# Patient Record
Sex: FEMALE | Race: WHITE | Employment: UNEMPLOYED | URBAN - NONMETROPOLITAN AREA
[De-identification: names, ages, dates, MRNs, and addresses within clinical notes are randomized per-mention and may not be internally consistent; named-entity substitution may affect disease eponyms.]

---

## 2021-11-28 ENCOUNTER — HOSPITAL ENCOUNTER (EMERGENCY)
Facility: HOSPITAL | Age: 51
Discharge: HOME | End: 2021-11-28
Attending: NURSE PRACTITIONER | Admitting: NURSE PRACTITIONER
Payer: COMMERCIAL

## 2021-11-28 VITALS
SYSTOLIC BLOOD PRESSURE: 133 MMHG | DIASTOLIC BLOOD PRESSURE: 74 MMHG | OXYGEN SATURATION: 98 % | TEMPERATURE: 99 F | HEART RATE: 66 BPM | RESPIRATION RATE: 14 BRPM

## 2021-11-28 DIAGNOSIS — R09.89 SYMPTOMS OF UPPER RESPIRATORY INFECTION (URI): ICD-10-CM

## 2021-11-28 DIAGNOSIS — Z20.822 EXPOSURE TO 2019 NOVEL CORONAVIRUS: Primary | ICD-10-CM

## 2021-11-28 LAB — GROUP A STREP BY PCR: NOT DETECTED

## 2021-11-28 PROCEDURE — C9803 HOPD COVID-19 SPEC COLLECT: HCPCS

## 2021-11-28 PROCEDURE — 87637 SARSCOV2&INF A&B&RSV AMP PRB: CPT | Performed by: NURSE PRACTITIONER

## 2021-11-28 PROCEDURE — 87651 STREP A DNA AMP PROBE: CPT | Performed by: NURSE PRACTITIONER

## 2021-11-28 PROCEDURE — G0463 HOSPITAL OUTPT CLINIC VISIT: HCPCS

## 2021-11-28 PROCEDURE — 99203 OFFICE O/P NEW LOW 30 MIN: CPT | Performed by: NURSE PRACTITIONER

## 2021-11-28 RX ORDER — SUCRALFATE 1 G/1
1 TABLET ORAL 2 TIMES DAILY
COMMUNITY

## 2021-11-28 RX ORDER — METFORMIN HCL 500 MG
500 TABLET, EXTENDED RELEASE 24 HR ORAL
COMMUNITY

## 2021-11-28 RX ORDER — PANTOPRAZOLE SODIUM 40 MG/1
40 TABLET, DELAYED RELEASE ORAL 2 TIMES DAILY
COMMUNITY

## 2021-11-28 RX ORDER — ESCITALOPRAM OXALATE 20 MG/1
20 TABLET ORAL DAILY
COMMUNITY

## 2021-11-28 ASSESSMENT — ENCOUNTER SYMPTOMS
TROUBLE SWALLOWING: 0
FEVER: 0
SORE THROAT: 1
CHILLS: 0
COUGH: 0
SHORTNESS OF BREATH: 0
APPETITE CHANGE: 0

## 2021-11-29 LAB
FLUAV RNA SPEC QL NAA+PROBE: NEGATIVE
FLUBV RNA RESP QL NAA+PROBE: NEGATIVE
RSV RNA SPEC NAA+PROBE: NEGATIVE
SARS-COV-2 RNA RESP QL NAA+PROBE: NEGATIVE

## 2021-11-29 NOTE — DISCHARGE INSTRUCTIONS
Make sure you are drinking fluids to stay hydrated.  Take an oral decongestant to help with your ear pain and sinus congestion.    We will notify you of your results when available.    Return to emergency room if worsening or concerning symptoms.

## 2021-11-29 NOTE — ED PROVIDER NOTES
History     Chief Complaint   Patient presents with     Covid Concern     pt requesting COVID swab. Exposure at her parents house, her father is positive      HPI  Harika Phillips is a 51 year old female who presents ambulatory to urgent care requesting a COVID-19 test.  Patient lives in [place].  She is in the area taking care of her mom who has COVID-19 infection.  In the last 3 days she has developed nasal congestion, sore throat and bilateral ear pain.  No cough, shortness of breath, fever or chills.  Eating and drinking okay.  She has had both of her COVID-19 vaccines.  Patient brought her dad in to the emergency department as he developed a fever.  While patient is here she decided she managed to get tested for COVID-19.  Patient has no other concerns at this time.    Allergies:  Allergies   Allergen Reactions     Corn Starch      Pork Allergy      Soy Allergy      Sulfa Drugs      Celecoxib Rash     Duloxetine Hcl Rash     Pregabalin Rash       Problem List:    There are no problems to display for this patient.       Past Medical History:    History reviewed. No pertinent past medical history.    Past Surgical History:    History reviewed. No pertinent surgical history.    Family History:    History reviewed. No pertinent family history.    Social History:  Marital Status:   [2]  Social History     Tobacco Use     Smoking status: None     Smokeless tobacco: None   Substance Use Topics     Alcohol use: None     Drug use: None        Medications:    escitalopram (LEXAPRO) 20 MG tablet  metFORMIN (GLUCOPHAGE-XR) 500 MG 24 hr tablet  pantoprazole (PROTONIX) 40 MG EC tablet  sucralfate (CARAFATE) 1 GM tablet          Review of Systems   Constitutional: Negative for appetite change, chills and fever.   HENT: Positive for congestion, ear pain and sore throat. Negative for ear discharge and trouble swallowing.    Respiratory: Negative for cough and shortness of breath.    All other systems reviewed and are  negative.      Physical Exam   BP: 133/74  Pulse: 66  Temp: 99  F (37.2  C)  Resp: 14  SpO2: 98 %      Physical Exam  Vitals and nursing note reviewed.   Constitutional:       Appearance: Normal appearance. She is not ill-appearing or toxic-appearing.   HENT:      Head: Normocephalic and atraumatic.      Jaw: No trismus.      Right Ear: Tympanic membrane and ear canal normal.      Left Ear: Tympanic membrane and ear canal normal.      Nose: Congestion present.      Mouth/Throat:      Mouth: Mucous membranes are moist.      Pharynx: Oropharynx is clear. Uvula midline. Posterior oropharyngeal erythema present. No oropharyngeal exudate.      Tonsils: No tonsillar exudate or tonsillar abscesses. 0 on the right. 0 on the left.   Eyes:      Pupils: Pupils are equal, round, and reactive to light.   Cardiovascular:      Rate and Rhythm: Normal rate and regular rhythm.      Heart sounds: Normal heart sounds.   Pulmonary:      Effort: Pulmonary effort is normal. No respiratory distress.      Breath sounds: Normal breath sounds. No stridor. No wheezing, rhonchi or rales.   Musculoskeletal:         General: Normal range of motion.      Cervical back: Neck supple.   Lymphadenopathy:      Cervical: No cervical adenopathy.   Skin:     General: Skin is warm and dry.   Neurological:      Mental Status: She is alert and oriented to person, place, and time.         ED Course        Procedures              Results for orders placed or performed during the hospital encounter of 11/28/21 (from the past 24 hour(s))   Group A Streptococcus PCR Throat Swab    Specimen: Throat; Swab   Result Value Ref Range    Group A strep by PCR Not Detected Not Detected    Narrative    The Xpert Xpress Strep A test, performed on the [employer], is a rapid, qualitative in vitro diagnostic test for the detection of Streptococcus pyogenes (Group A ß-hemolytic Streptococcus, Strep A) in throat swab specimens from patients with signs and  symptoms of pharyngitis. The Xpert Xpress Strep A test can be used as an aid in the diagnosis of Group A Streptococcal pharyngitis. The assay is not intended to monitor treatment for Group A Streptococcus infections. The Xpert Xpress Strep A test utilizes an automated real-time polymerase chain reaction (PCR) to detect Streptococcus pyogenes DNA.       Medications - No data to display    Assessments & Plan (with Medical Decision Making)     I have reviewed the nursing notes.    Pleasant 51-year-old female that presented requesting COVID-19 testing after she developed URI symptoms and exposure to COVID-19 virus.  Her respirations are nonlabored.  Heart rate and rhythm regular.  No tonsillar exudate or hypertrophy.  Her vital signs are within normal limits.    COVID-19 test done with results pending.  Negative strep.    Recommended pushing fluids, oral decongestant and drinking adequate fluids to stay hydrated.  She will be notified of her COVID-19 result when available.  Return to ED/UC for worsening or concerning symptoms.    I have reviewed the findings, diagnosis, plan and need for follow up with the patient.  This document was prepared using a combination of typing and voice generated software.  While every attempt was made for accuracy, spelling and grammatical errors may exist.    New Prescriptions    No medications on file       Final diagnoses:   Exposure to 2019 novel coronavirus   Symptoms of upper respiratory infection (URI)       11/28/2021   [facility]     Mpofu, Prudence, CNP  11/28/21 2045

## 2021-11-29 NOTE — ED TRIAGE NOTES
Pt currently staying with her parents in [place]. Today her father had a fever and her mother is COVID positive. Pt requesting COVID swab. C/o right ear pain.